# Patient Record
Sex: FEMALE | Race: BLACK OR AFRICAN AMERICAN | Employment: UNEMPLOYED | URBAN - METROPOLITAN AREA
[De-identification: names, ages, dates, MRNs, and addresses within clinical notes are randomized per-mention and may not be internally consistent; named-entity substitution may affect disease eponyms.]

---

## 2020-01-26 ENCOUNTER — APPOINTMENT (OUTPATIENT)
Dept: ULTRASOUND IMAGING | Facility: CLINIC | Age: 25
End: 2020-01-26
Attending: EMERGENCY MEDICINE
Payer: MEDICAID

## 2020-01-26 ENCOUNTER — HOSPITAL ENCOUNTER (EMERGENCY)
Facility: CLINIC | Age: 25
Discharge: HOME | End: 2020-01-26
Attending: EMERGENCY MEDICINE | Admitting: EMERGENCY MEDICINE
Payer: MEDICAID

## 2020-01-26 VITALS
RESPIRATION RATE: 16 BRPM | TEMPERATURE: 97.9 F | HEART RATE: 68 BPM | BODY MASS INDEX: 27.48 KG/M2 | OXYGEN SATURATION: 100 % | WEIGHT: 140 LBS | SYSTOLIC BLOOD PRESSURE: 105 MMHG | DIASTOLIC BLOOD PRESSURE: 53 MMHG | HEIGHT: 60 IN

## 2020-01-26 DIAGNOSIS — Z34.91 FIRST TRIMESTER PREGNANCY: ICD-10-CM

## 2020-01-26 DIAGNOSIS — N83.202 CYST OF LEFT OVARY: ICD-10-CM

## 2020-01-26 LAB
ALBUMIN SERPL-MCNC: 3.7 G/DL (ref 3.4–5)
ALBUMIN UR-MCNC: NEGATIVE MG/DL
ALP SERPL-CCNC: 97 U/L (ref 40–150)
ALT SERPL W P-5'-P-CCNC: 21 U/L (ref 0–50)
ANION GAP SERPL CALCULATED.3IONS-SCNC: 4 MMOL/L (ref 3–14)
APPEARANCE UR: CLEAR
AST SERPL W P-5'-P-CCNC: 13 U/L (ref 0–45)
B-HCG SERPL-ACNC: 712 IU/L (ref 0–5)
BASOPHILS # BLD AUTO: 0 10E9/L (ref 0–0.2)
BASOPHILS NFR BLD AUTO: 0.8 %
BILIRUB SERPL-MCNC: 0.2 MG/DL (ref 0.2–1.3)
BILIRUB UR QL STRIP: NEGATIVE
BUN SERPL-MCNC: 11 MG/DL (ref 7–30)
CALCIUM SERPL-MCNC: 8.6 MG/DL (ref 8.5–10.1)
CHLORIDE SERPL-SCNC: 109 MMOL/L (ref 94–109)
CO2 SERPL-SCNC: 26 MMOL/L (ref 20–32)
COLOR UR AUTO: YELLOW
CREAT SERPL-MCNC: 0.76 MG/DL (ref 0.52–1.04)
DIFFERENTIAL METHOD BLD: ABNORMAL
EOSINOPHIL # BLD AUTO: 0.1 10E9/L (ref 0–0.7)
EOSINOPHIL NFR BLD AUTO: 2.3 %
ERYTHROCYTE [DISTWIDTH] IN BLOOD BY AUTOMATED COUNT: 16.4 % (ref 10–15)
GFR SERPL CREATININE-BSD FRML MDRD: >90 ML/MIN/{1.73_M2}
GLUCOSE SERPL-MCNC: 84 MG/DL (ref 70–99)
GLUCOSE UR STRIP-MCNC: NEGATIVE MG/DL
HCT VFR BLD AUTO: 31.5 % (ref 35–47)
HGB BLD-MCNC: 9.9 G/DL (ref 11.7–15.7)
HGB UR QL STRIP: NEGATIVE
IMM GRANULOCYTES # BLD: 0 10E9/L (ref 0–0.4)
IMM GRANULOCYTES NFR BLD: 0.2 %
KETONES UR STRIP-MCNC: NEGATIVE MG/DL
LEUKOCYTE ESTERASE UR QL STRIP: ABNORMAL
LIPASE SERPL-CCNC: 88 U/L (ref 73–393)
LYMPHOCYTES # BLD AUTO: 2.1 10E9/L (ref 0.8–5.3)
LYMPHOCYTES NFR BLD AUTO: 38.9 %
MCH RBC QN AUTO: 25.1 PG (ref 26.5–33)
MCHC RBC AUTO-ENTMCNC: 31.4 G/DL (ref 31.5–36.5)
MCV RBC AUTO: 80 FL (ref 78–100)
MONOCYTES # BLD AUTO: 0.4 10E9/L (ref 0–1.3)
MONOCYTES NFR BLD AUTO: 7.5 %
MUCOUS THREADS #/AREA URNS LPF: PRESENT /LPF
NEUTROPHILS # BLD AUTO: 2.7 10E9/L (ref 1.6–8.3)
NEUTROPHILS NFR BLD AUTO: 50.3 %
NITRATE UR QL: NEGATIVE
NRBC # BLD AUTO: 0 10*3/UL
NRBC BLD AUTO-RTO: 0 /100
PH UR STRIP: 6.5 PH (ref 5–7)
PLATELET # BLD AUTO: 298 10E9/L (ref 150–450)
POTASSIUM SERPL-SCNC: 4.1 MMOL/L (ref 3.4–5.3)
PROT SERPL-MCNC: 7.6 G/DL (ref 6.8–8.8)
RBC # BLD AUTO: 3.94 10E12/L (ref 3.8–5.2)
RBC #/AREA URNS AUTO: 1 /HPF (ref 0–2)
SODIUM SERPL-SCNC: 139 MMOL/L (ref 133–144)
SOURCE: ABNORMAL
SP GR UR STRIP: 1.02 (ref 1–1.03)
SQUAMOUS #/AREA URNS AUTO: 3 /HPF (ref 0–1)
UROBILINOGEN UR STRIP-MCNC: NORMAL MG/DL (ref 0–2)
WBC # BLD AUTO: 5.3 10E9/L (ref 4–11)
WBC #/AREA URNS AUTO: 5 /HPF (ref 0–5)

## 2020-01-26 PROCEDURE — 84702 CHORIONIC GONADOTROPIN TEST: CPT | Performed by: EMERGENCY MEDICINE

## 2020-01-26 PROCEDURE — 81001 URINALYSIS AUTO W/SCOPE: CPT | Performed by: EMERGENCY MEDICINE

## 2020-01-26 PROCEDURE — 99284 EMERGENCY DEPT VISIT MOD MDM: CPT | Mod: 25

## 2020-01-26 PROCEDURE — 85025 COMPLETE CBC W/AUTO DIFF WBC: CPT | Performed by: EMERGENCY MEDICINE

## 2020-01-26 PROCEDURE — 80053 COMPREHEN METABOLIC PANEL: CPT | Performed by: EMERGENCY MEDICINE

## 2020-01-26 PROCEDURE — 25000132 ZZH RX MED GY IP 250 OP 250 PS 637: Performed by: EMERGENCY MEDICINE

## 2020-01-26 PROCEDURE — 83690 ASSAY OF LIPASE: CPT | Performed by: EMERGENCY MEDICINE

## 2020-01-26 RX ORDER — ACETAMINOPHEN 325 MG/1
650 TABLET ORAL ONCE
Status: COMPLETED | OUTPATIENT
Start: 2020-01-26 | End: 2020-01-26

## 2020-01-26 RX ADMIN — ACETAMINOPHEN 650 MG: 325 TABLET, FILM COATED ORAL at 12:43

## 2020-01-26 ASSESSMENT — ENCOUNTER SYMPTOMS
ABDOMINAL PAIN: 1
COUGH: 0
APPETITE CHANGE: 0
FEVER: 0
BLOOD IN STOOL: 1
VOMITING: 0
DYSURIA: 0
DIARRHEA: 0

## 2020-01-26 ASSESSMENT — MIFFLIN-ST. JEOR: SCORE: 1306.54

## 2020-01-26 NOTE — ED AVS SNAPSHOT
Emergency Department  [address] [place] [id number]  Phone:  [phone]  Fax:  [phone]                                    Dianne Lopez   MRN: [id number]    Department:   Emergency Department   Date of Visit:  1/26/2020           After Visit Summary Signature Page    I have received my discharge instructions, and my questions have been answered. I have discussed any challenges I see with this plan with the nurse or doctor.    ..........................................................................................................................................  Patient/Patient Representative Signature      ..........................................................................................................................................  Patient Representative Print Name and Relationship to Patient    ..................................................               ................................................  Date                                   Time    ..........................................................................................................................................  Reviewed by Signature/Title    ...................................................              ..............................................  Date                                               Time          22EPIC Rev 08/18

## 2020-01-26 NOTE — DISCHARGE INSTRUCTIONS
Follow-up with your OB as scheduled this week for repeat ultrasound and hCG.  Return to the emergency department immediately if you have severe pain on the left side.  You have a large cyst on the left side and if this twists this can cause severe pain.

## 2020-01-26 NOTE — ED PROVIDER NOTES
History     Chief Complaint:  Abdominal Pain    HPI   Dianne Lopez is a 5 week pregnant  24 year old female who presents with abdominal pain. The patient complains of bilateral lower and upper abdominal pain that started on Friday (2020). She states that the pain began as intermittent, but has now become constant and sometimes goes to her chest and sides. The patient states that nothing makes the pain better, but laying can worsen it. She denies any fever, cough, vomiting, dysuria, abnormal vaginal discharge, vaginal bleeding, or changes in bowel movements. The patient is not currently taking any prenatal vitamins. She denies any history of ovarian cysts.  The patient visited her obgyn two days ago and had her hormone levels checked (427 HCG) as well as reporting her pain. She was told to come to the emergency department if her pain worsened. Her last period was 2019.    Allergies:  No known drug allergies    Medications:    The patient is not currently taking any prescribed medications.    Past Medical History:    The patient does not have any past pertinent medical history.    Past Surgical History:    History reviewed. No pertinent surgical history.    Family History:    History reviewed. No pertinent family history.     Social History:  Drug use: unknown  The patient presents to the emergency department alone  Smoking status: unknown   Alcohol use: unknown   PCP: No primary care provider on file.  Marital Status:  Single [1]    Review of Systems   Constitutional: Negative for appetite change and fever.   Respiratory: Negative for cough.    Gastrointestinal: Positive for abdominal pain and blood in stool. Negative for diarrhea and vomiting.   Genitourinary: Negative for dysuria and vaginal discharge.   All other systems reviewed and are negative.    Physical Exam     Patient Vitals for the past 24 hrs:   BP Temp Temp src Pulse Resp SpO2 Height Weight   20 1324 105/53 -- -- 68 16 -- --  --   01/26/20 1044 119/72 97.9  F (36.6  C) Oral 74 16 100 % 1.524 m (5') 63.5 kg (140 lb)     Physical Exam    Physical Exam   Constitutional:  Patient is oriented to person, place, and time. They appear well-developed and well-nourished.HENT:   Mouth/Throat:   Oropharynx is clear and moist.   Eyes:    Conjunctivae normal and EOM are normal. Pupils are equal, round, and reactive to light.   Neck:    Normal range of motion.   Cardiovascular: Normal rate, regular rhythm and normal heart sounds.  Exam reveals no gallop and no friction rub.  No murmur heard.  Pulmonary/Chest:  Effort normal and breath sounds normal. Patient has no wheezes. Patient has no rales.   Abdominal:   Soft. Bowel sounds are normal. Patient exhibits no mass. There is no tenderness. There is no rebound and no guarding.   Musculoskeletal:  Normal range of motion. Patient exhibits no edema.   Neurological:   Patient is alert and oriented to person, place, and time. Patient has normal strength. No cranial nerve deficit or sensory deficit. GCS 15  Skin:   Skin is warm and dry. No rash noted. No erythema.   Psychiatric:   Patient has a normal mood and affect. Patient's behavior is normal. Judgment and thought content normal.       Emergency Department Course   Imaging:  Radiology findings were communicated with the patient who voiced understanding of the findings.    US OB 1st Trimester with Doppler  IMPRESSION:   1. No evidence for intrauterine pregnancy in this patient with a  positive beta hCG (712). Ectopic pregnancy is not excluded. However,  given the low beta hCG, it is likely that the gestational sac is yet  to be visualized. Recommend serial quantitative beta hCGs with  follow-up ultrasound in 10-14 days to further assess for development  of intrauterine pregnancy unless clinical condition dictates earlier  follow-up.  2. Large cystic lesion with septation and mural nodularity involving  the left adnexa and likely the left ovary. This  measures 10.2 x 8.8 x  5.9 cm. This demonstrates flow on waveform analysis. This is  indeterminate and could represent a neoplastic/malignant process.  Other considerations would include a large old hemorrhagic cyst or  even an old endometrioma with degraded blood products. This is  unlikely to be representative of an ectopic pregnancy as this would be  an atypical presentation for the beta-hCG level. Consider  obstetrician/gynecologic consultation and follow-up ultrasound  imaging.  3. Normal appearance of the right ovary with flow present on waveform  analysis.  4. No free fluid in the pelvis.  As read by Radiology.    Laboratory:  Laboratory findings were communicated with the patient who voiced understanding of the findings.    HCG quantitative pregnancy: 712 (H)     CBC: HGB 9.9 (L) o/w WNL (WBC 5.3, )    Lipase (1101): 88    CMP: AWNL (Creatinine 0.76)     UA: Leukocyte Esterase: Large, Squamous Epithelial: 3 (H), Mucous: Present, o/w Negative    Procedures:  1243 Tylenol 650 mg PO    Emergency Department Course:  Past medical records, nursing notes, and vitals reviewed.  1056: I performed an exam of the patient and obtained history, as documented above.     IV inserted and blood drawn.    Urinalysis and culture obtained and sent for evaluation.    The patient was sent for a US OB 1st Trimester with Doppler while in the emergency department, results above.     1256: I paged the [facility]    1302: I discussed the patient with the  OB, who the patient sees for her obgyn care.    1314: I rechecked the patient. I reviewed the results with the Patient and answered all related questions prior to discharge.     Findings and plan explained to the Patient. Patient discharged home with instructions regarding supportive care, medications, and reasons to return. The importance of close follow-up was reviewed.     Impression & Plan   Medical Decision Making:  Shayy Lopez is a 24-year-old female presenting  with mild pain in the lower abdominal area and across the upper abdomen.  This is very mildly reproducible on palpation not a surgical abdomen.  Vitally she was normal.  Basic blood work was obtained as noted above.  No acute abnormalities.  Ultrasound was performed due to her early pregnancy however her hCG is still below the levels upon which we would expect to see any IUP or ectopic pregnancy.  There is a large cyst in her left ovary which I am favoring is more of a cystic lesion as her hormone levels are not that high where we would expect to see anything of this size.  The patient has never had an ultrasound before so is never been told she has cysts.  I discussed this with her OB service and they recommend that she continue with her ultrasound as planned on the 31st.  They will reexamine the cyst at that time.  I did give her torsion precautions specifically to return if she develops acute left lower pain.  I described to her the mechanism of torsion and the implications of twisting the ovary.  She does understand these discharge instructions.  She will follow-up with her OB as scheduled.    Diagnosis:    ICD-10-CM   1. Cyst of left ovary N83.202   2. First trimester pregnancy Z34.91       Disposition:  Discharged to home.    Charmaine Mendoza  1/26/2020    EMERGENCY DEPARTMENT  Scribe Disclosure:  Charmaine DIAZ, am serving as a scribe at 10:56 AM on 1/26/2020 to document services personally performed by Eleanor Rodriguez MD based on my observations and the provider's statements to me.        Eleanor Rodriguez MD  01/26/20 6747

## 2020-02-25 PROCEDURE — 99284 EMERGENCY DEPT VISIT MOD MDM: CPT | Performed by: EMERGENCY MEDICINE
